# Patient Record
Sex: MALE | Race: WHITE | NOT HISPANIC OR LATINO | Employment: FULL TIME | URBAN - METROPOLITAN AREA
[De-identification: names, ages, dates, MRNs, and addresses within clinical notes are randomized per-mention and may not be internally consistent; named-entity substitution may affect disease eponyms.]

---
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
## 2022-09-14 ENCOUNTER — TELEPHONE (OUTPATIENT)
Dept: MEDICAL GROUP | Facility: CLINIC | Age: 22
End: 2022-09-14
Payer: COMMERCIAL

## 2022-09-14 NOTE — TELEPHONE ENCOUNTER
Nishi,    Please find Tee an appointment with me. Well visit.  He is available Tuesday or Thursday morning from now through December.    Thanks.

## 2022-10-12 ENCOUNTER — OFFICE VISIT (OUTPATIENT)
Dept: MEDICAL GROUP | Facility: CLINIC | Age: 22
End: 2022-10-12
Payer: COMMERCIAL

## 2022-10-12 VITALS
HEART RATE: 68 BPM | OXYGEN SATURATION: 97 % | BODY MASS INDEX: 23.38 KG/M2 | DIASTOLIC BLOOD PRESSURE: 64 MMHG | TEMPERATURE: 98 F | SYSTOLIC BLOOD PRESSURE: 116 MMHG | WEIGHT: 188 LBS | HEIGHT: 75 IN

## 2022-10-12 DIAGNOSIS — Z13.29 SCREENING FOR HYPOTHYROIDISM: ICD-10-CM

## 2022-10-12 DIAGNOSIS — Z11.3 ROUTINE SCREENING FOR STI (SEXUALLY TRANSMITTED INFECTION): ICD-10-CM

## 2022-10-12 DIAGNOSIS — Z13.220 SCREENING FOR HYPERCHOLESTEROLEMIA: ICD-10-CM

## 2022-10-12 DIAGNOSIS — Z00.00 WELL ADULT EXAM: Primary | ICD-10-CM

## 2022-10-12 DIAGNOSIS — I47.10 PAROXYSMAL SUPRAVENTRICULAR TACHYCARDIA (HCC): ICD-10-CM

## 2022-10-12 DIAGNOSIS — Z13.0 SCREENING FOR DEFICIENCY ANEMIA: ICD-10-CM

## 2022-10-12 DIAGNOSIS — Z72.52 HIGH RISK HOMOSEXUAL BEHAVIOR: ICD-10-CM

## 2022-10-12 DIAGNOSIS — B08.1 MOLLUSCUM CONTAGIOSUM: ICD-10-CM

## 2022-10-12 PROCEDURE — 17110 DESTRUCTION B9 LES UP TO 14: CPT | Performed by: FAMILY MEDICINE

## 2022-10-12 PROCEDURE — 99385 PREV VISIT NEW AGE 18-39: CPT | Mod: 25 | Performed by: FAMILY MEDICINE

## 2022-10-12 RX ORDER — IMIQUIMOD 12.5 MG/.25G
CREAM TOPICAL
Qty: 24 EACH | Refills: 3 | Status: SHIPPED | OUTPATIENT
Start: 2022-10-12

## 2022-10-12 ASSESSMENT — PATIENT HEALTH QUESTIONNAIRE - PHQ9: CLINICAL INTERPRETATION OF PHQ2 SCORE: 0

## 2022-10-12 NOTE — PROGRESS NOTES
CC:  Diagnoses of Well adult exam, Screening for deficiency anemia, Screening for hypercholesterolemia, Screening for hypothyroidism, Molluscum contagiosum, Paroxysmal supraventricular tachycardia (HCC), and Routine screening for STI (sexually transmitted infection) were pertinent to this visit.    HISTORY OF THE PRESENT ILLNESS: Patient is a 21 y.o. male. This pleasant patient is here today to establish care with me and for a well visit.     Problem   Well Adult Exam  Denies anhedonia, SI or HI.        Allergies: Patient has no known allergies.    No current [facility]-ordered outpatient medications on file.     No current [facility]-ordered facility-administered medications on file.       Past Medical History:   Diagnosis Date    Arrhythmia     SVT    Breath shortness     no sob at this time; c/o sob during svt       Patient Care Team                H Aniceto Rock M.D. PCP - General, Pediatrics [phone]     [address] [facility] [address]             Past Surgical History:   Procedure Laterality Date    OTHER CARDIAC SURGERY      ablation of aberrant beats       Social History     Tobacco Use    Smoking status: Never    Smokeless tobacco: Never   Vaping Use    Vaping Use: Never used   Substance Use Topics    Alcohol use: Yes     Comment: 14 drinks/week    Drug use: No       Social History     Social History Narrative    Not on file       History reviewed. No pertinent family history.    ROS:     - Constitutional:Negative for fever, chills, unexpected weight change, and fatigue/generalized weakness.     - HEENT: Negative for vision changes, hearing changes, ear pain, ear discharge, rhinorrhea, sinus congestion, sore throat    - Respiratory: Negative for cough, wheezing, and crackles.      - Cardiovascular: Negative for chest pain.    - Gastrointestinal: Negative for heartburn, nausea, vomiting, abdominal pain, hematochezia.    - Genitourinary: Negative for dysuria, polyuria.    - Musculoskeletal:Negative for  "myalgias, back pain.    - Skin:Few bumps on left upper leg x 6 months.  No exudate.    - Neurological: Negative for dizziness, focal weakness.    - Endo/Heme/Allergies: Does not bruise/bleed easily.     - Psychiatric/Behavioral: Negative for depression, suicidal/homicidal ideation.        Exam: /64 (BP Location: Right arm, Patient Position: Sitting, BP Cuff Size: Adult)   Pulse 68   Temp 36.7 °C (98 °F)   Ht 1.905 m (6' 3\")   Wt 85.3 kg (188 lb)   SpO2 97%  Body mass index is 23.5 kg/m².    General: Normal appearing. No distress.  HEENT: Normocephalic. Eyes conjunctiva clear lids without ptosis, pupils equal and reactive to light accommodation, ears normal shape and contour, canals are clear bilaterally, tympanic membranes are benign, nasal mucosa benign, oropharynx is without erythema, edema or exudates.   Neck: Thyroid is not enlarged.  Pulmonary: Clear to ausculation.  Normal effort. No rales, ronchi, or wheezing.  Cardiovascular: Regular rate and rhythm without murmur.   Abdomen: Soft, nontender, nondistended. Normal bowel sounds.   Neurologic: Grossly nonfocal  Lymph: No cervical, supraclavicular or axillary lymph nodes are palpable  Skin: Warm and dry.  Skin colored papular domes on left upper thigh. No umbilication. No erythema.  Musculoskeletal: Normal gait.  Psych: Normal mood and affect. Alert and oriented x3. Judgment and insight is normal.    Assessment/Plan    21 y.o. male with the following-  Problem List Items Addressed This Visit       Paroxysmal supraventricular tachycardia (HCC)     Chronic condition. Resolved. Will check TSH.         Well adult exam     Health maintenance. Patient's vital signs and weight were reviewed.  Discussed diet and exercise: 30 minutes of moderate exercise daily suggested.  Previous labs were reviewed.  Current medical issues with medications, labs, and screening tests reviewed with the patient.  Immunizations per orders: Flu vaccine suggested every fall.  Covid " vaccine with booster also suggested.  Received flu and covid vaccine last week.          Relevant Orders    CBC WITH DIFFERENTIAL    Lipid Profile    Comp Metabolic Panel    TSH WITH REFLEX TO FT4    Molluscum contagiosum     Chronic condition. Uncontrolled.  Present for 6 months.  Patient educated with [email or web address] about molluscum.  Opted for cryotherapy today and Aldara cream once lesions blister and fall off. All questions answered.  STI testing ordered.     Procedure Note: Informed consent obtained.  Area cleaned with alcohol. Application of Liquid Nitrogen for 2 freeze/thaw cycles. Post treatment discussed including expected course.  Patient was told to apply vaseline twice a day to sites that were frozen. If a blister forms, they were advised to not pop it, just apply Vaseline until resolved. If the lesion is painful, has pus or any other concerns, the patient knows to call the office.  Keep treated areas out of the sun. Aldara is also suggested to place on the site after it has healed in case the lesion is still present.   Return to clinic at any time for further evaluation.            Other Visit Diagnoses           Screening for deficiency anemia        Relevant Orders    CBC WITH DIFFERENTIAL    Screening for hypercholesterolemia        Relevant Orders    Lipid Profile    Comp Metabolic Panel    Screening for hypothyroidism        Relevant Orders    TSH WITH REFLEX TO FT4    Routine screening for STI (sexually transmitted infection)        Relevant Orders    HIV AG/AB COMBO ASSAY DIAGNOSTIC    Chlamydia/GC, PCR (Urine)    TREPONEMA PALLIDUM ANTIBODY, IGG BY IFA (CSF)          No follow-ups on file.

## 2022-10-12 NOTE — ASSESSMENT & PLAN NOTE
Chronic condition. Resolved. Will check TSH.  
Chronic condition. Uncontrolled.  Present for 6 months.  Patient educated with [email or web address] about molluscum.  Opted for cryotherapy today and Aldara cream once lesions blister and fall off. All questions answered.  STI testing ordered.   
Health maintenance. Patient's vital signs and weight were reviewed.  Discussed diet and exercise: 30 minutes of moderate exercise daily suggested.  Previous labs were reviewed.  Current medical issues with medications, labs, and screening tests reviewed with the patient.  Immunizations per orders: Flu vaccine suggested every fall.  Covid vaccine with booster also suggested.  Received flu and covid vaccine last week.   
1.79